# Patient Record
Sex: MALE | Race: BLACK OR AFRICAN AMERICAN | NOT HISPANIC OR LATINO | Employment: UNEMPLOYED | URBAN - NONMETROPOLITAN AREA
[De-identification: names, ages, dates, MRNs, and addresses within clinical notes are randomized per-mention and may not be internally consistent; named-entity substitution may affect disease eponyms.]

---

## 2022-07-01 DIAGNOSIS — E07.9 THYROID DYSFUNCTION: Primary | ICD-10-CM

## 2022-12-16 ENCOUNTER — HOSPITAL ENCOUNTER (EMERGENCY)
Facility: HOSPITAL | Age: 42
Discharge: HOME | End: 2022-12-17
Attending: EMERGENCY MEDICINE
Payer: MEDICAID

## 2022-12-16 DIAGNOSIS — B02.9 HERPES ZOSTER WITHOUT COMPLICATION: Primary | ICD-10-CM

## 2022-12-16 PROCEDURE — 99284 EMERGENCY DEPT VISIT MOD MDM: CPT

## 2022-12-16 RX ORDER — KETOROLAC TROMETHAMINE 30 MG/ML
15 INJECTION, SOLUTION INTRAMUSCULAR; INTRAVENOUS
Status: COMPLETED | OUTPATIENT
Start: 2022-12-16 | End: 2022-12-17

## 2022-12-16 RX ORDER — HYDROCODONE BITARTRATE AND ACETAMINOPHEN 5; 325 MG/1; MG/1
1 TABLET ORAL EVERY 8 HOURS PRN
Qty: 6 TABLET | Refills: 0 | Status: SHIPPED | OUTPATIENT
Start: 2022-12-16

## 2022-12-16 RX ORDER — HYDROCODONE BITARTRATE AND ACETAMINOPHEN 5; 325 MG/1; MG/1
1 TABLET ORAL
Status: COMPLETED | OUTPATIENT
Start: 2022-12-16 | End: 2022-12-17

## 2022-12-16 RX ORDER — ACYCLOVIR 200 MG/1
800 CAPSULE ORAL ONCE
Status: DISCONTINUED | OUTPATIENT
Start: 2022-12-16 | End: 2022-12-17

## 2022-12-16 RX ORDER — DIPHENHYDRAMINE HCL 25 MG
25 CAPSULE ORAL EVERY 6 HOURS PRN
Qty: 30 CAPSULE | Refills: 0 | Status: SHIPPED | OUTPATIENT
Start: 2022-12-16

## 2022-12-16 RX ORDER — VALACYCLOVIR HYDROCHLORIDE 1 G/1
1000 TABLET, FILM COATED ORAL 3 TIMES DAILY
Qty: 21 TABLET | Refills: 0 | Status: SHIPPED | OUTPATIENT
Start: 2022-12-16 | End: 2022-12-23

## 2022-12-16 RX ORDER — LIDOCAINE 50 MG/G
OINTMENT TOPICAL
Qty: 3 G | Refills: 0 | Status: SHIPPED | OUTPATIENT
Start: 2022-12-16

## 2022-12-16 RX ORDER — ACYCLOVIR 800 MG/1
TABLET ORAL
Status: DISCONTINUED
Start: 2022-12-16 | End: 2022-12-17 | Stop reason: WASHOUT

## 2022-12-16 RX ORDER — NAPROXEN 500 MG/1
500 TABLET ORAL EVERY 12 HOURS PRN
Qty: 20 TABLET | Refills: 0 | Status: SHIPPED | OUTPATIENT
Start: 2022-12-16

## 2022-12-17 VITALS
DIASTOLIC BLOOD PRESSURE: 75 MMHG | HEART RATE: 101 BPM | SYSTOLIC BLOOD PRESSURE: 119 MMHG | TEMPERATURE: 98 F | RESPIRATION RATE: 18 BRPM | OXYGEN SATURATION: 99 %

## 2022-12-17 PROCEDURE — 25000003 PHARM REV CODE 250: Performed by: EMERGENCY MEDICINE

## 2022-12-17 PROCEDURE — 63600175 PHARM REV CODE 636 W HCPCS: Performed by: EMERGENCY MEDICINE

## 2022-12-17 PROCEDURE — 96372 THER/PROPH/DIAG INJ SC/IM: CPT | Performed by: EMERGENCY MEDICINE

## 2022-12-17 RX ORDER — VALACYCLOVIR HYDROCHLORIDE 500 MG/1
1000 TABLET, FILM COATED ORAL ONCE
Status: COMPLETED | OUTPATIENT
Start: 2022-12-17 | End: 2022-12-17

## 2022-12-17 RX ADMIN — HYDROCODONE BITARTRATE AND ACETAMINOPHEN 1 TABLET: 5; 325 TABLET ORAL at 12:12

## 2022-12-17 RX ADMIN — KETOROLAC TROMETHAMINE 15 MG: 30 INJECTION, SOLUTION INTRAMUSCULAR at 12:12

## 2022-12-17 RX ADMIN — VALACYCLOVIR HYDROCHLORIDE 1000 MG: 500 TABLET, FILM COATED ORAL at 12:12

## 2022-12-17 NOTE — ED PROVIDER NOTES
EMERGENCY DEPARTMENT HISTORY AND PHYSICAL EXAM     This note is dictated on [employer] word recognition program.  There are word recognition mistakes and grammatical errors that are occasionally missed on review.     Date: 12/16/2022   Patient Name: Kwame Marshall       History of Presenting Illness           Chief Complaint   Patient presents with    Rash     Patient reports having a rash over left shoulder and back of neck starting 2 days ago. Patient reports rash burns and itches, and started about 24 hours after visiting someone's house.         History Provided By: Patient    2334   Kwame Marshall is a 42 y.o. male with PMHX of hypothyroid who presents to the emergency department C/O rash.      Patient reports left rash that he noticed 2 days ago.  Rash is along left upper back in around towards left chest.  No prior episodes.  Patient reports rash is painful and burning.    PCP: BUCK Finch        Current Facility-Administered Medications   Medication Dose Route Frequency Provider Last Rate Last Admin    acyclovir capsule 800 mg  800 mg Oral Once Eliseo Stewart MD        HYDROcodone-acetaminophen 5-325 mg per tablet 1 tablet  1 tablet Oral ED 1 Time Eliseo Stewart MD        ketorolac injection 15 mg  15 mg Intramuscular ED 1 Time Eliseo Stewart MD         Current Outpatient Medications   Medication Sig Dispense Refill    diphenhydrAMINE (BENADRYL) 25 mg capsule Take 1 capsule (25 mg total) by mouth every 6 (six) hours as needed for Itching. 30 capsule 0    HYDROcodone-acetaminophen (NORCO) 5-325 mg per tablet Take 1 tablet by mouth every 8 (eight) hours as needed for Pain (Severe pain). 6 tablet 0    LIDOcaine (XYLOCAINE) 5 % Oint ointment Apply topically as needed (pain). 3 g 0    naproxen (NAPROSYN) 500 MG tablet Take 1 tablet (500 mg total) by mouth every 12 (twelve) hours as needed (Pain). 20 tablet 0    valACYclovir (VALTREX) 1000 MG tablet Take 1 tablet (1,000 mg total) by  mouth 3 (three) times daily. for 7 days 21 tablet 0           Past History     Past Medical History:   History reviewed. No pertinent past medical history.     Past Surgical History:   History reviewed. No pertinent surgical history.     Family History:   History reviewed. No pertinent family history.     Social History:   Social History     Tobacco Use    Smoking status: Every Day     Packs/day: 0.50     Types: Cigarettes   Substance Use Topics    Alcohol use: No        Allergies:   Review of patient's allergies indicates:  No Known Allergies       Review of Systems   Review of Systems   Constitutional:  Negative for chills and fever.   Skin:  Positive for rash.   All other systems reviewed and are negative.             Physical Exam     Vitals:    12/16/22 2317 12/16/22 2323   BP: 119/75    Pulse: 101    Resp: 18    Temp:  98.4 °F (36.9 °C)   SpO2: 99%       Physical Exam  Vitals and nursing note reviewed.   Constitutional:       General: He is not in acute distress.     Appearance: Normal appearance. He is well-developed. He is not ill-appearing.   HENT:      Head: Normocephalic and atraumatic.   Eyes:      Extraocular Movements: Extraocular movements intact.      Conjunctiva/sclera: Conjunctivae normal.   Pulmonary:      Effort: Pulmonary effort is normal. No respiratory distress.   Musculoskeletal:         General: No deformity or signs of injury. Normal range of motion.      Cervical back: Normal range of motion. No rigidity.   Skin:     General: Skin is dry.      Coloration: Skin is not pale.      Findings: No rash.             Comments: Vesicular rash involving left upper back and left upper chest along a dermatomal pattern consistent with shingles   Neurological:      General: No focal deficit present.      Mental Status: He is alert and oriented to person, place, and time.      Cranial Nerves: No cranial nerve deficit.      Motor: No weakness.      Coordination: Coordination normal.   Psychiatric:          Mood and Affect: Mood normal.         Behavior: Behavior normal.            Diagnostic Study Results      Labs -   No results found for this or any previous visit (from the past 12 hour(s)).     Radiologic Studies -    No orders to display        Medications given in the ED-   Medications   acyclovir capsule 800 mg (has no administration in time range)   ketorolac injection 15 mg (has no administration in time range)   HYDROcodone-acetaminophen 5-325 mg per tablet 1 tablet (has no administration in time range)           Medical Decision Making    I am the first provider for this patient.     I reviewed the vital signs, available nursing notes, past medical history, past surgical history, family history and social history.     Vital Signs:  Reviewed the patient's vital signs.     Pulse Oximetry Analysis and Interpretation:    99% on Room Air, normal      Records Reviewed: Nursing notes.        Provider Notes (Medical Decision Making): Kwame Marshall is a 42 y.o. male with shingles.  Rash for 2 days.  Discussed typical course.  Patient afebrile.  Will start on antiviral course and analgesia provided.  Advised follow-up primary care provider.  Reasons to return to ED discussed.        Procedures:   Procedures      ED Course:               Diagnosis and Disposition     Critical Care:      DISCHARGE NOTE:       Kwame Marshall's  results have been reviewed with him.  He has been counseled regarding his diagnosis, treatment, and plan.  He verbally conveys understanding and agreement of the signs, symptoms, diagnosis, treatment and prognosis and additionally agrees to follow up as discussed.  He also agrees with the care-plan and conveys that all of his questions have been answered.  I have also provided discharge instructions for him that include: educational information regarding their diagnosis and treatment, and list of reasons why they would want to return to the ED prior to their follow-up appointment, should his  condition change. He has been provided with education for proper emergency department utilization.         CLINICAL IMPRESSION:         1. Herpes zoster without complication              PLAN:   1. Discharge Home  2.      Medication List        START taking these medications      diphenhydrAMINE 25 mg capsule  Commonly known as: BENADRYL  Take 1 capsule (25 mg total) by mouth every 6 (six) hours as needed for Itching.     HYDROcodone-acetaminophen 5-325 mg per tablet  Commonly known as: NORCO  Take 1 tablet by mouth every 8 (eight) hours as needed for Pain (Severe pain).     LIDOcaine 5 % Oint ointment  Commonly known as: XYLOCAINE  Apply topically as needed (pain).     naproxen 500 MG tablet  Commonly known as: NAPROSYN  Take 1 tablet (500 mg total) by mouth every 12 (twelve) hours as needed (Pain).     valACYclovir 1000 MG tablet  Commonly known as: VALTREX  Take 1 tablet (1,000 mg total) by mouth 3 (three) times daily. for 7 days               Where to Get Your Medications        You can get these medications from any pharmacy    Bring a paper prescription for each of these medications  diphenhydrAMINE 25 mg capsule  HYDROcodone-acetaminophen 5-325 mg per tablet  LIDOcaine 5 % Oint ointment  naproxen 500 MG tablet  valACYclovir 1000 MG tablet        3. [facility] Montes, YANELI-[facility]  [address] [place] [id number]  [phone]    Schedule an appointment as soon as possible for a visit   Primary care follow up       _______________________________     Please note that this dictation was completed with [employer], the computer voice recognition software.  Quite often unanticipated grammatical, syntax, homophones, and other interpretive errors are inadvertently transcribed by the computer software.  Please disregard these errors.  Please excuse any errors that have escaped final proofreading.             Eliseo Stewart MD  12/17/22 0142